# Patient Record
Sex: MALE | Race: WHITE | Employment: FULL TIME | URBAN - METROPOLITAN AREA
[De-identification: names, ages, dates, MRNs, and addresses within clinical notes are randomized per-mention and may not be internally consistent; named-entity substitution may affect disease eponyms.]

---

## 2023-05-31 ENCOUNTER — OFFICE VISIT (OUTPATIENT)
Dept: PRIMARY CARE | Facility: CLINIC | Age: 38
End: 2023-05-31
Payer: COMMERCIAL

## 2023-05-31 VITALS
BODY MASS INDEX: 42.09 KG/M2 | OXYGEN SATURATION: 97 % | DIASTOLIC BLOOD PRESSURE: 90 MMHG | RESPIRATION RATE: 18 BRPM | WEIGHT: 294 LBS | HEART RATE: 79 BPM | HEIGHT: 70 IN | SYSTOLIC BLOOD PRESSURE: 124 MMHG

## 2023-05-31 DIAGNOSIS — S39.012A STRAIN OF LUMBAR REGION, INITIAL ENCOUNTER: ICD-10-CM

## 2023-05-31 DIAGNOSIS — S29.019D THORACIC MYOFASCIAL STRAIN, SUBSEQUENT ENCOUNTER: ICD-10-CM

## 2023-05-31 DIAGNOSIS — M54.2 CERVICALGIA: Primary | ICD-10-CM

## 2023-05-31 DIAGNOSIS — L98.9 CHANGING SKIN LESION: ICD-10-CM

## 2023-05-31 DIAGNOSIS — M25.511 RIGHT SHOULDER PAIN, UNSPECIFIED CHRONICITY: ICD-10-CM

## 2023-05-31 PROBLEM — M66.239 EXTENSOR TENDON RUPTURE, NON-TRAUMATIC, HAND AND WRIST: Status: ACTIVE | Noted: 2023-05-31

## 2023-05-31 PROBLEM — J02.9 SORE THROAT: Status: ACTIVE | Noted: 2023-05-31

## 2023-05-31 PROBLEM — M76.60 ACHILLES TENDINITIS: Status: ACTIVE | Noted: 2023-05-31

## 2023-05-31 PROBLEM — L23.7 CONTACT DERMATITIS DUE TO POISON IVY: Status: ACTIVE | Noted: 2023-05-31

## 2023-05-31 PROBLEM — L03.114 CELLULITIS OF HAND, LEFT: Status: ACTIVE | Noted: 2023-05-31

## 2023-05-31 PROBLEM — M20.019 MALLET FINGER: Status: ACTIVE | Noted: 2023-05-31

## 2023-05-31 PROBLEM — R51.9 HEADACHE: Status: ACTIVE | Noted: 2023-05-31

## 2023-05-31 PROBLEM — S61.412D LACERATION OF LEFT HAND, SUBSEQUENT ENCOUNTER: Status: ACTIVE | Noted: 2023-05-31

## 2023-05-31 PROBLEM — F98.8 ADD (ATTENTION DEFICIT DISORDER): Status: ACTIVE | Noted: 2023-05-31

## 2023-05-31 PROBLEM — R41.840 ATTENTION DISTURBANCE: Status: ACTIVE | Noted: 2023-05-31

## 2023-05-31 PROBLEM — L25.9 ACUTE CONTACT DERMATITIS: Status: ACTIVE | Noted: 2023-05-31

## 2023-05-31 PROBLEM — S29.019A THORACIC MYOFASCIAL STRAIN: Status: ACTIVE | Noted: 2023-05-31

## 2023-05-31 PROBLEM — J20.9 ACUTE BRONCHITIS: Status: ACTIVE | Noted: 2023-05-31

## 2023-05-31 PROBLEM — B35.6 JOCK ITCH: Status: ACTIVE | Noted: 2023-05-31

## 2023-05-31 PROBLEM — M66.249 EXTENSOR TENDON RUPTURE, NON-TRAUMATIC, HAND AND WRIST: Status: ACTIVE | Noted: 2023-05-31

## 2023-05-31 PROCEDURE — 1036F TOBACCO NON-USER: CPT | Performed by: FAMILY MEDICINE

## 2023-05-31 PROCEDURE — 99214 OFFICE O/P EST MOD 30 MIN: CPT | Performed by: FAMILY MEDICINE

## 2023-05-31 RX ORDER — BUPROPION HYDROCHLORIDE 150 MG/1
150 TABLET ORAL EVERY MORNING
COMMUNITY
Start: 2023-05-17

## 2023-05-31 RX ORDER — TRAZODONE HYDROCHLORIDE 50 MG/1
50 TABLET ORAL NIGHTLY
COMMUNITY
Start: 2023-04-22

## 2023-05-31 RX ORDER — MULTIVITAMIN
1 TABLET ORAL DAILY
COMMUNITY
Start: 2021-03-01

## 2023-05-31 RX ORDER — DEXTROAMPHETAMINE SACCHARATE, AMPHETAMINE ASPARTATE MONOHYDRATE, DEXTROAMPHETAMINE SULFATE AND AMPHETAMINE SULFATE 7.5; 7.5; 7.5; 7.5 MG/1; MG/1; MG/1; MG/1
30 CAPSULE, EXTENDED RELEASE ORAL EVERY MORNING
COMMUNITY
Start: 2023-05-30

## 2023-05-31 ASSESSMENT — PATIENT HEALTH QUESTIONNAIRE - PHQ9
1. LITTLE INTEREST OR PLEASURE IN DOING THINGS: NOT AT ALL
2. FEELING DOWN, DEPRESSED OR HOPELESS: NOT AT ALL
SUM OF ALL RESPONSES TO PHQ9 QUESTIONS 1 & 2: 0

## 2023-05-31 NOTE — PATIENT INSTRUCTIONS
Evaluating for pain and discomfort of multiple areas.    X-ray of the cervical spine is going to be performed.    X-ray of the thoracic spine being performed    X-ray of the lumbosacral spine going to be performed.    Recommend seeing dermatologist regarding changing skin lesion was to see Dr. Hanson.    Right shoulder x-ray also going to be performed.  [facility].    We are going continue to follow-up with chiropractic therapy as well.  If any worsening of pain or discomfort, please call and let us know.
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
## 2023-07-20 ENCOUNTER — OFFICE VISIT (OUTPATIENT)
Dept: PRIMARY CARE | Facility: CLINIC | Age: 38
End: 2023-07-20
Payer: COMMERCIAL

## 2023-07-20 VITALS
HEART RATE: 82 BPM | BODY MASS INDEX: 40.8 KG/M2 | WEIGHT: 285 LBS | OXYGEN SATURATION: 97 % | TEMPERATURE: 97.3 F | SYSTOLIC BLOOD PRESSURE: 130 MMHG | DIASTOLIC BLOOD PRESSURE: 92 MMHG | HEIGHT: 70 IN

## 2023-07-20 DIAGNOSIS — M25.562 ACUTE PAIN OF LEFT KNEE: Primary | ICD-10-CM

## 2023-07-20 PROCEDURE — 99213 OFFICE O/P EST LOW 20 MIN: CPT | Performed by: STUDENT IN AN ORGANIZED HEALTH CARE EDUCATION/TRAINING PROGRAM

## 2023-07-20 PROCEDURE — 1036F TOBACCO NON-USER: CPT | Performed by: STUDENT IN AN ORGANIZED HEALTH CARE EDUCATION/TRAINING PROGRAM

## 2023-07-20 ASSESSMENT — ENCOUNTER SYMPTOMS
MYALGIAS: 0
ABDOMINAL PAIN: 0
SHORTNESS OF BREATH: 0
BACK PAIN: 0
CHILLS: 0
ARTHRALGIAS: 1
FATIGUE: 0
FEVER: 0
NECK PAIN: 0

## 2023-07-20 NOTE — PROGRESS NOTES
"Subjective   Patient ID: Angel Rinaldi is a 37 y.o. male who presents for Knee Pain (Left knee pain X Saturday no injury).    Knee Pain        Left knee pain going on for the past 5 days or so.  He feels like it is swollen and that he had twisted it.  He has been using an ice pack around the knee at night.  Taking ibuprofen as well.  He goes up and down steps at work. In the morning it seems fine but as the day goes on it gets worse.  He states he sometimes even has pain with crossing his legs in the evening.  No real pain in the morning.  He does have a physical job but he states it is not overwhelmingly taxing.    Review of Systems   Constitutional:  Negative for chills, fatigue and fever.   Respiratory:  Negative for shortness of breath.    Cardiovascular:  Negative for chest pain.   Gastrointestinal:  Negative for abdominal pain.   Musculoskeletal:  Positive for arthralgias (left knee). Negative for back pain, myalgias and neck pain.       Objective   BP (!) 130/92   Pulse 82   Temp 36.3 °C (97.3 °F)   Ht 1.778 m (5' 10\")   Wt 129 kg (285 lb)   SpO2 97%   BMI 40.89 kg/m²     Physical Exam  Vitals and nursing note reviewed.   Constitutional:       General: He is not in acute distress.     Appearance: Normal appearance. He is obese. He is not ill-appearing or toxic-appearing.   Musculoskeletal:         General: Tenderness (Tenderness to palpation over the lateral aspect of the left knee.) present. No swelling, deformity or signs of injury. Normal range of motion.      Right lower leg: No edema.      Left lower leg: No edema.      Comments: Varus and valgus testing negative.  Gracie's testing negative.  Lachman's testing negative.  No pain with resisted flexion and extension of the leg with straightening of the left knee.  No posterior tenderness.  No bruising.   Neurological:      Mental Status: He is alert.         Assessment/Plan   Problem List Items Addressed This Visit    None  Visit Diagnoses       Acute " pain of left knee    -  Primary    Relevant Orders    XR knee left 3 views          Uncertain etiology of the patient's pain.  We will start with x-rays of the left knee.  Likely a strain around the area in the knee without any acute injury.  Some pain laterally.  Discussed regular use of stretching, heat and ice and other conservative management.  He will call if symptoms continue.

## 2023-12-20 ENCOUNTER — OFFICE VISIT (OUTPATIENT)
Dept: DERMATOLOGY | Facility: CLINIC | Age: 38
End: 2023-12-20
Payer: COMMERCIAL

## 2023-12-20 DIAGNOSIS — L90.5 SCAR CONDITIONS AND FIBROSIS OF SKIN: Primary | ICD-10-CM

## 2023-12-20 PROCEDURE — 1036F TOBACCO NON-USER: CPT | Performed by: DERMATOLOGY

## 2023-12-20 PROCEDURE — 99213 OFFICE O/P EST LOW 20 MIN: CPT | Performed by: DERMATOLOGY

## 2023-12-20 ASSESSMENT — ITCH NUMERIC RATING SCALE: HOW SEVERE IS YOUR ITCHING?: 0

## 2023-12-20 ASSESSMENT — DERMATOLOGY PATIENT ASSESSMENT
HAVE YOU HAD OR DO YOU HAVE A STAPH INFECTION: NO
DO YOU USE SUNSCREEN: OCCASIONALLY
HAVE YOU HAD OR DO YOU HAVE VASCULAR DISEASE: NO
DO YOU USE A TANNING BED: NO
ARE YOU AN ORGAN TRANSPLANT RECIPIENT: NO
FOR PATIENTS COMING IN FOR A FOLLOW-UP VISIT - HAVE THERE BEEN ANY CHANGES IN YOUR HEALTH SINCE YOUR LAST VISIT: NO
DO YOU HAVE ANY NEW OR CHANGING LESIONS: NO

## 2023-12-20 ASSESSMENT — DERMATOLOGY QUALITY OF LIFE (QOL) ASSESSMENT
DATE THE QUALITY-OF-LIFE ASSESSMENT WAS COMPLETED: 66828
RATE HOW EMOTIONALLY BOTHERED YOU ARE BY YOUR SKIN PROBLEM (FOR EXAMPLE, WORRY, EMBARRASSMENT, FRUSTRATION): 0 - NEVER BOTHERED
ARE THERE EXCLUSIONS OR EXCEPTIONS FOR THE QUALITY OF LIFE ASSESSMENT: NO
WHAT SINGLE SKIN CONDITION LISTED BELOW IS THE PATIENT ANSWERING THE QUALITY-OF-LIFE ASSESSMENT QUESTIONS ABOUT: NONE OF THE ABOVE
RATE HOW BOTHERED YOU ARE BY SYMPTOMS OF YOUR SKIN PROBLEM (EG, ITCHING, STINGING BURNING, HURTING OR SKIN IRRITATION): 0 - NEVER BOTHERED
RATE HOW BOTHERED YOU ARE BY EFFECTS OF YOUR SKIN PROBLEMS ON YOUR ACTIVITIES (EG, GOING OUT, ACCOMPLISHING WHAT YOU WANT, WORK ACTIVITIES OR YOUR RELATIONSHIPS WITH OTHERS): 0 - NEVER BOTHERED

## 2023-12-20 ASSESSMENT — PATIENT GLOBAL ASSESSMENT (PGA): PATIENT GLOBAL ASSESSMENT: PATIENT GLOBAL ASSESSMENT:  1 - CLEAR

## 2023-12-20 NOTE — PROGRESS NOTES
Subjective     Angel Rinaldi is a 38 y.o. male who presents for the following: Suspicious Skin Lesion (Left Lower Abdomen. Bx 8/31/23 w/  Mild to Moderate Atypia. Pt reports no changes at this time. ). Previous path showed mild to monitor atypia and close to margin - recommended close monitoring.    Review of Systems:  No other skin or systemic complaints other than what is documented elsewhere in the note.    The following portions of the chart were reviewed this encounter and updated as appropriate:          Skin Cancer History  No skin cancer on file.      Specialty Problems          Dermatology Problems    Acute contact dermatitis    Changing skin lesion    Jock itch        Objective   Well appearing patient in no apparent distress; mood and affect are within normal limits.    A focused skin examination was performed of the left abdomen. All findings within normal limits unless otherwise noted below.    Assessment/Plan   1. Scar conditions and fibrosis of skin  Left lower abdome  Well healed scar at site of prior treatment without evidence of recurrence or repigmentation.    Dysplastic nevi (moles) are atypical moles that clinically and microscopically appear different from benign, common mole. They can be described by a pathologist as mild, moderately or severely atypical.  They are not pre-cancerous.  Treatment of these lesions vary. Mildly atypical moles are typically not treated; moderately atypical moles are observed or at times depending on the pathology report or recurrence noted clinically can be treated with shave or wide local excision to be sure it is adequately removed and there are no other abnormalities in the surrounding tissue.  Severely atypical moles are nearly always treated with a excision.  Having multiple atypical moles are a marker of risk of potentially developing melanoma somewhere on the body at some point during a patient's lifetime.   If you have had atypical moles you should be seen  semi-annually or annually for full body skin examinations.  Full body photography of your moles may be recommended.    Follow up in for FBSE from year of previous skin check of 9/2023.    Related Procedures  Follow Up In Dermatology - Established Patient

## 2024-09-25 ENCOUNTER — APPOINTMENT (OUTPATIENT)
Dept: DERMATOLOGY | Facility: CLINIC | Age: 39
End: 2024-09-25
Payer: COMMERCIAL

## 2024-10-17 ENCOUNTER — APPOINTMENT (OUTPATIENT)
Dept: DERMATOLOGY | Facility: CLINIC | Age: 39
End: 2024-10-17
Payer: COMMERCIAL

## 2025-03-19 ENCOUNTER — OFFICE VISIT (OUTPATIENT)
Dept: PRIMARY CARE | Facility: CLINIC | Age: 40
End: 2025-03-19
Payer: COMMERCIAL

## 2025-03-19 ENCOUNTER — HOSPITAL ENCOUNTER (OUTPATIENT)
Dept: RADIOLOGY | Facility: CLINIC | Age: 40
Discharge: HOME | End: 2025-03-19
Payer: COMMERCIAL

## 2025-03-19 VITALS
DIASTOLIC BLOOD PRESSURE: 78 MMHG | BODY MASS INDEX: 38.02 KG/M2 | WEIGHT: 265 LBS | HEART RATE: 74 BPM | SYSTOLIC BLOOD PRESSURE: 130 MMHG

## 2025-03-19 DIAGNOSIS — M54.50 ACUTE BILATERAL LOW BACK PAIN, UNSPECIFIED WHETHER SCIATICA PRESENT: ICD-10-CM

## 2025-03-19 DIAGNOSIS — M54.50 ACUTE BILATERAL LOW BACK PAIN, UNSPECIFIED WHETHER SCIATICA PRESENT: Primary | ICD-10-CM

## 2025-03-19 PROCEDURE — 1036F TOBACCO NON-USER: CPT | Performed by: FAMILY MEDICINE

## 2025-03-19 PROCEDURE — 99214 OFFICE O/P EST MOD 30 MIN: CPT | Performed by: FAMILY MEDICINE

## 2025-03-19 PROCEDURE — 72100 X-RAY EXAM L-S SPINE 2/3 VWS: CPT

## 2025-03-19 RX ORDER — CYCLOBENZAPRINE HCL 10 MG
10 TABLET ORAL NIGHTLY PRN
Qty: 30 TABLET | Refills: 0 | Status: SHIPPED | OUTPATIENT
Start: 2025-03-19 | End: 2025-05-18

## 2025-03-19 RX ORDER — PREDNISONE 5 MG/1
TABLET ORAL
Qty: 55 TABLET | Refills: 0 | Status: SHIPPED | OUTPATIENT
Start: 2025-03-19

## 2025-03-19 ASSESSMENT — PATIENT HEALTH QUESTIONNAIRE - PHQ9
10. IF YOU CHECKED OFF ANY PROBLEMS, HOW DIFFICULT HAVE THESE PROBLEMS MADE IT FOR YOU TO DO YOUR WORK, TAKE CARE OF THINGS AT HOME, OR GET ALONG WITH OTHER PEOPLE: NOT DIFFICULT AT ALL
1. LITTLE INTEREST OR PLEASURE IN DOING THINGS: NOT AT ALL
SUM OF ALL RESPONSES TO PHQ9 QUESTIONS 1 AND 2: 0
2. FEELING DOWN, DEPRESSED OR HOPELESS: NOT AT ALL

## 2025-03-19 ASSESSMENT — ENCOUNTER SYMPTOMS
DEPRESSION: 0
ACTIVITY CHANGE: 0
WHEEZING: 0
CONSTIPATION: 0
BACK PAIN: 1
COLOR CHANGE: 0
MYALGIAS: 0
CHILLS: 0
PALPITATIONS: 0
COUGH: 0
DIARRHEA: 0
FEVER: 0
OCCASIONAL FEELINGS OF UNSTEADINESS: 0
EYE DISCHARGE: 0
CONFUSION: 0
FACIAL SWELLING: 0
JOINT SWELLING: 0
LOSS OF SENSATION IN FEET: 0
ARTHRALGIAS: 0
APPETITE CHANGE: 0

## 2025-03-19 NOTE — PROGRESS NOTES
Subjective   Patient ID: Angel Rinaldi is a 39 y.o. male who presents for Back Pain (Picking up a dead lift and felt something pop in low back yesterday at the gym. ).    Back Pain  Pertinent negatives include no chest pain or fever.      She reports has been having back pain he reports that he picked up a dead lift and felt something pop in his low back yesterday at the gym.  Denies any fever chills denies any nausea vomiting constipation.    Review of Systems   Constitutional:  Negative for activity change, appetite change, chills and fever.   HENT:  Negative for congestion, ear pain and facial swelling.    Eyes:  Negative for discharge.   Respiratory:  Negative for cough and wheezing.    Cardiovascular:  Negative for chest pain, palpitations and leg swelling.   Gastrointestinal:  Negative for constipation and diarrhea.   Musculoskeletal:  Positive for back pain. Negative for arthralgias, gait problem, joint swelling and myalgias.   Skin:  Negative for color change and pallor.   Neurological:  Negative for syncope.   Psychiatric/Behavioral:  Negative for confusion.        Objective   /78 (BP Location: Right arm, Patient Position: Sitting)   Pulse 74   Wt 120 kg (265 lb)   BMI 38.02 kg/m²   BSA Body surface area is 2.43 meters squared.      Physical Exam  Constitutional:       General: He is not in acute distress.     Appearance: Normal appearance. He is not toxic-appearing.   HENT:      Head: Normocephalic.      Right Ear: Tympanic membrane, ear canal and external ear normal.      Left Ear: Tympanic membrane, ear canal and external ear normal.      Mouth/Throat:      Pharynx: Oropharynx is clear.   Eyes:      Conjunctiva/sclera: Conjunctivae normal.      Pupils: Pupils are equal, round, and reactive to light.   Cardiovascular:      Rate and Rhythm: Normal rate and regular rhythm.      Pulses: Normal pulses.      Heart sounds: Normal heart sounds.   Pulmonary:      Effort: No respiratory distress.      Breath  sounds: No wheezing, rhonchi or rales.   Musculoskeletal:         General: Swelling and tenderness present.      Comments: Pain with palpation over bilateral paraspinal muscles of lumbar spine, no spinous process tenderness   Skin:     Findings: No lesion or rash.   Neurological:      General: No focal deficit present.      Mental Status: He is alert and oriented to person, place, and time. Mental status is at baseline.      Gait: Gait normal.   Psychiatric:         Mood and Affect: Mood normal.         Behavior: Behavior normal.         Thought Content: Thought content normal.         Judgment: Judgment normal.       No visits with results within 1 Year(s) from this visit.   Latest known visit with results is:   Legacy Encounter on 08/31/2023   Component Date Value Ref Range Status    Pathology Report 08/31/2023    Final                    Value:Name MORRIS HOGAN                                                                                                   Accession #: [id number]            Pathologist:                   NICOLE CRAIN MD  Date of Procedure:    8/31/2023  Date Received:          9/1/2023  Date Reported           9/5/2023  Submitting Physician:   REYNALDO HOPE DO  Location:                    [facility]  Other External #                                                                    FINAL DIAGNOSIS  SKIN, L LOWER ABDOMEN, SHAVE BIOPSY:  COMPOUND DYSPLASTIC NEVUS WITH MILD TO MODERATE ATYPIA, APPROACHING A  PERIPHERAL MARGIN IN THESE PLANES OF SECTION (SEE COMMENT):    Comment: The broad bisected shave specimen predominantly reveals a compound  melanocytic proliferation with mild cytologic and architectural atypia.  However, the melanocytic proliferation is broad and poorly circumscribed, and  the junctional component approaches the peripheral margin in multiple sections.      If a pigmented lesion of conc                          zaria persists or recurs in this anatomic location,  then re-scoop  shave or conservative re-excision would be recommended at that  time.    **Electronically Signed Out by NICOLE CRAIN MD.**                                                                                                                                                                                                                                                                                                                                                                                                                                                                                         Electronically Signed Out By NICOLE CRAIN MD/DEVON  By the signature on this report, the individual or group listed as making the  Final Interpretation/Diagnosis certifies that they have reviewed this case.  Diagnostic interpretation performed at  [facility] [address] [id number],  [facility] [address]           Microscopic                           Description:  The hematoxylin and eosin-stained sections reveal a compound melanocytic  proliferation involving an area of epidermal lentiginous hyperplasia.  There is  increased basal layer melanin pigment.  There is scattered mild junctional  cytologic atypia.  The junctional melanocytes are predominantly nested at the  tips and sides of rete, with bridging of adjacent rete and concentric papillary  dermal fibrosis.  There are central, well-nested dermal melanocytes that  exhibit maturation and dispersal with descent.  There is no significant  underlying solar elastosis.  The junctional component extends beyond the dermal  component.  There are scattered papillary dermal melanophages.  The  proliferation is broad and poorly circumscribed.    Clinical History:  1-cm, asymmetric patch, r/o atypical nevus.  Shave biopsy.    Specimens Submitted As:  A: SKIN, L LOWER ABDOMEN     Gross Description:  Received in formalin is one tan-brown piece of skin measuring 10 x 7 x 1 mm.    Inked and em                          bedded in toto.    mlz/9/1/2023               [facility]  Dermatopathology Laboratory [place], [place]  [id number]  [address] [place] [address]        CONVERTED FINAL DIAGNOSIS 08/31/2023    Final                    Value:SKIN, L LOWER ABDOMEN, SHAVE BIOPSY:  COMPOUND DYSPLASTIC NEVUS WITH MILD TO MODERATE ATYPIA, APPROACHING A  PERIPHERAL MARGIN IN THESE PLANES OF SECTION (SEE COMMENT):    Comment: The broad bisected shave specimen predominantly reveals a compound  melanocytic proliferation with mild cytologic and architectural atypia.  However, the melanocytic proliferation is broad and poorly circumscribed, and  the junctional component approaches the peripheral margin in multiple sections.      If a pigmented lesion of concern persists or recurs in this anatomic location,  then re-scoop shave or conservative re-excision would be recommended at that  time.    **Electronically Signed Out by NICOLE CRAIN MD.**          CONVERTED CLINICAL DIAGNOSIS-HISTO* 08/31/2023 1-cm, asymmetric patch, r/o atypical nevus.  Shave biopsy.   Final    CONVERTED GROSS DESCRIPTION 08/31/2023    Final                    Value:Received in formalin is one tan-brown piece of skin measuring 10 x 7 x 1 mm.   Inked and embedded in toto.      CONVERTED MICROSCOPIC DESCRIPTION 08/31/2023    Final                    Value:The hematoxylin and eosin-stained sections reveal a compound melanocytic  proliferation involving an area of epidermal lentiginous hyperplasia.  There is  increased basal layer melanin pigment.  There is scattered mild junctional  cytologic atypia.  The junctional melanocytes are predominantly nested at the  tips and sides of rete, with bridging of adjacent rete and concentric papillary  dermal fibrosis.  There are central, well-nested dermal melanocytes that  exhibit maturation and dispersal with descent.  There is no significant  underlying solar elastosis.   The junctional component extends beyond the dermal  component.  There are scattered papillary dermal melanophages.  The  proliferation is broad and poorly circumscribed.      CONVERTED FINAL REPORT PDF LINK TO* 08/31/2023 \\copathshare\copath\PDF 2022_[id number].pdf   Final     Current Outpatient Medications on File Prior to Visit   Medication Sig Dispense Refill    amphetamine-dextroamphetamine XR (Adderall XR) 30 mg 24 hr capsule Take 1 capsule (30 mg) by mouth once daily in the morning.      buPROPion XL (Wellbutrin XL) 150 mg 24 hr tablet Take 1 tablet (150 mg) by mouth once daily in the morning.      L. acidophilus/Bifid. animalis 32 billion cell capsule Take 1 capsule by mouth once daily.      multivitamin tablet Take 1 tablet by mouth once daily.      traZODone (Desyrel) 50 mg tablet Take 1 tablet (50 mg) by mouth once daily at bedtime.       No current facility-administered medications on file prior to visit.     No images are attached to the encounter.            Assessment/Plan   Diagnoses and all orders for this visit:  Acute bilateral low back pain, unspecified whether sciatica present  -     XR lumbar spine 2-3 views; Future  -     cyclobenzaprine (Flexeril) 10 mg tablet; Take 1 tablet (10 mg) by mouth as needed at bedtime for muscle spasms.  -     predniSONE (Deltasone) 5 mg tablet; 10 then 9 then 8 then 7 etc. until gone    1.  Patient to start on Flexeril and prednisone  2.  Patient to do x-ray of lumbar spine  3.  Patient to call for questions or concerns